# Patient Record
Sex: MALE | Race: WHITE
[De-identification: names, ages, dates, MRNs, and addresses within clinical notes are randomized per-mention and may not be internally consistent; named-entity substitution may affect disease eponyms.]

---

## 2019-01-21 ENCOUNTER — HOSPITAL ENCOUNTER (OUTPATIENT)
Dept: HOSPITAL 88 - ENDO | Age: 54
Discharge: HOME | End: 2019-01-21
Attending: INTERNAL MEDICINE
Payer: COMMERCIAL

## 2019-01-21 VITALS — DIASTOLIC BLOOD PRESSURE: 80 MMHG | SYSTOLIC BLOOD PRESSURE: 123 MMHG

## 2019-01-21 DIAGNOSIS — K21.9: ICD-10-CM

## 2019-01-21 DIAGNOSIS — K59.00: ICD-10-CM

## 2019-01-21 DIAGNOSIS — E11.9: ICD-10-CM

## 2019-01-21 DIAGNOSIS — K62.89: ICD-10-CM

## 2019-01-21 DIAGNOSIS — K20.9: ICD-10-CM

## 2019-01-21 DIAGNOSIS — F41.9: ICD-10-CM

## 2019-01-21 DIAGNOSIS — E78.00: ICD-10-CM

## 2019-01-21 DIAGNOSIS — Z91.040: ICD-10-CM

## 2019-01-21 DIAGNOSIS — K29.50: ICD-10-CM

## 2019-01-21 DIAGNOSIS — K51.50: Primary | ICD-10-CM

## 2019-01-21 DIAGNOSIS — M54.5: ICD-10-CM

## 2019-01-21 DIAGNOSIS — Z91.018: ICD-10-CM

## 2019-01-21 DIAGNOSIS — I10: ICD-10-CM

## 2019-01-21 DIAGNOSIS — D12.3: ICD-10-CM

## 2019-01-21 DIAGNOSIS — J45.909: ICD-10-CM

## 2019-01-21 LAB
BASOPHILS # BLD AUTO: 0.1 10*3/UL (ref 0–0.1)
BASOPHILS NFR BLD AUTO: 0.5 % (ref 0–1)
DEPRECATED NEUTROPHILS # BLD AUTO: 7.2 10*3/UL (ref 2.1–6.9)
EOSINOPHIL # BLD AUTO: 0.2 10*3/UL (ref 0–0.4)
EOSINOPHIL NFR BLD AUTO: 2 % (ref 0–6)
ERYTHROCYTE [DISTWIDTH] IN CORD BLOOD: 13.1 % (ref 11.7–14.4)
HCT VFR BLD AUTO: 49.6 % (ref 38.2–49.6)
HGB BLD-MCNC: 18.1 G/DL (ref 14–18)
LACTOFERRIN STL QL: NEGATIVE
LYMPHOCYTES # BLD: 2.1 10*3/UL (ref 1–3.2)
LYMPHOCYTES NFR BLD AUTO: 20.1 % (ref 18–39.1)
MCH RBC QN AUTO: 31 PG (ref 28–32)
MCHC RBC AUTO-ENTMCNC: 36.5 G/DL (ref 31–35)
MCV RBC AUTO: 84.9 FL (ref 81–99)
MONOCYTES # BLD AUTO: 1 10*3/UL (ref 0.2–0.8)
MONOCYTES NFR BLD AUTO: 9.2 % (ref 4.4–11.3)
NEUTS SEG NFR BLD AUTO: 67.9 % (ref 38.7–80)
PLATELET # BLD AUTO: 316 X10E3/UL (ref 140–360)
RBC # BLD AUTO: 5.84 X10E6/UL (ref 4.3–5.7)

## 2019-01-21 PROCEDURE — 85025 COMPLETE CBC W/AUTO DIFF WBC: CPT

## 2019-01-21 PROCEDURE — 87328 CRYPTOSPORIDIUM AG IA: CPT

## 2019-01-21 PROCEDURE — 45378 DIAGNOSTIC COLONOSCOPY: CPT

## 2019-01-21 PROCEDURE — 87045 FECES CULTURE AEROBIC BACT: CPT

## 2019-01-21 PROCEDURE — 83993 ASSAY FOR CALPROTECTIN FECAL: CPT

## 2019-01-21 PROCEDURE — 87493 C DIFF AMPLIFIED PROBE: CPT

## 2019-01-21 PROCEDURE — 36415 COLL VENOUS BLD VENIPUNCTURE: CPT

## 2019-01-21 PROCEDURE — 43239 EGD BIOPSY SINGLE/MULTIPLE: CPT

## 2019-01-21 PROCEDURE — 83630 LACTOFERRIN FECAL (QUAL): CPT

## 2019-01-21 PROCEDURE — 87177 OVA AND PARASITES SMEARS: CPT

## 2019-01-21 PROCEDURE — 45384 COLONOSCOPY W/LESION REMOVAL: CPT

## 2019-01-21 PROCEDURE — 82948 REAGENT STRIP/BLOOD GLUCOSE: CPT

## 2019-01-21 PROCEDURE — 45385 COLONOSCOPY W/LESION REMOVAL: CPT

## 2019-01-21 PROCEDURE — 45380 COLONOSCOPY AND BIOPSY: CPT

## 2019-01-21 NOTE — OPERATIVE REPORT
DATE OF PROCEDURE:  January 21, 2019 



REFERRING PHYSICIAN:  Dr. Hernandez. 



PROCEDURES PERFORMED:  

1. Esophagogastroduodenoscopy with biopsies.

2. Colonoscopy with polypectomy and biopsies.



INDICATIONS FOR ESOPHAGOGASTRODUODENOSCOPY:  Acid reflux.  



INDICATIONS FOR COLONOSCOPY:  Crampy lower abdominal pain.  Chronic 

diarrhea.



MEDICATION:  Patient was done under MAC.  Please see anesthesiologist's 

note.



PROCEDURE:  With the patient in the left lateral decubitus position, the 

flexible fiberoptic Olympus gastroscope was introduced into the esophagus 

under direct visualization without any difficulty.  There was some patchy 

erythema noted in the distal esophagus.  The scope was then advanced with 

ease into the stomach, and the mucosa overlying the antrum and the body 

revealed some patchy erythema and low-grade to moderate edema, and biopsies 

were obtained and sent to stain for H. pylori.  Pylorus appeared to be of 

normal contour and shape, was intubated with ease, and the scope was 

advanced all the way to the 2nd portion of the duodenum.  Biopsies were 

obtained from the proximal 2nd portion and the duodenal bulb to rule out 

sprue considering patient's history of chronic diarrhea.  The scope was 

then withdrawn back into the stomach and retroflexed, and mucosa overlying 

the fundus and the cardia appeared to be within normal limits.  The scope 

was then straightened out.  It was subsequently withdrawn.  Patient 

tolerated the procedure well.



IMPRESSION:  

1. Distal esophagitis.

2. Gastritis biopsied.  Biopsies sent to stain for H. pylori.

3. Rule out sprue.



PLAN:  Follow up histology.  Initiate Protonix 40 mg one p.o. q.a.m. a.c. 



Patient was then turned around and after adequate lubrication of the anal 

canal, a flexible fiberoptic Olympus colonoscope was inserted into the 

rectum with ease and advanced all the way to the cecum.  Mucosa overlying 

the cecum appeared to be within normal limits.  The ileocecal valve was 

intubated, and the scope was advanced into the terminal ileum.  Biopsies 

were obtained.  The scope was then withdrawn back into the colon.  It was 

then withdrawn slowly.  Mucosa overlying the ascending colon appeared to be 

within normal limits.  One polyp was snared from the transverse colon.  

Mild patchy inflammatory changes were noted in the left colon, and multiple 

random biopsies were obtained.  Similar findings were noted in the rectum, 

and biopsies were obtained.  The scope was then retroflexed into the distal 

rectum and small internal hemorrhoids were noted, none of which was 

actively bleeding.  The scope was then straightened out.  It was 

subsequently withdrawn after securing an adequate stool specimen that was 

sent for the appropriate stool studies.  Patient tolerated the procedure 

well.  



IMPRESSION:   

1. Transverse colon polyp snared.  

1. Mild patchy left-sided colitis.  

2. Sigmoid colon polyp hot biopsied.  

3. Proctitis, mild.  Biopsied.  

4. Internal hemorrhoids, none actively bleeding.  



PLAN:   Follow up histology.  Follow up stool studies.  Initiate VSL#3 one 

p.o. daily and Bentyl 10 mg one p.o. t.i.d..  Patient might benefit from a 

followup colonoscopy in 3 to 5 years. 





DD:  01/21/2019 13:59

DT:  01/21/2019 14:15

Job#:  [id number]

cc:       DR. HERNANDEZ

## 2019-01-22 LAB — C DIFFICILE TOXIN A&B AMP PROB: NEGATIVE
